# Patient Record
Sex: MALE | Race: BLACK OR AFRICAN AMERICAN | NOT HISPANIC OR LATINO | Employment: OTHER | URBAN - METROPOLITAN AREA
[De-identification: names, ages, dates, MRNs, and addresses within clinical notes are randomized per-mention and may not be internally consistent; named-entity substitution may affect disease eponyms.]

---

## 2024-08-30 ENCOUNTER — APPOINTMENT (OUTPATIENT)
Dept: OTOLARYNGOLOGY | Facility: CLINIC | Age: 89
End: 2024-08-30
Payer: COMMERCIAL

## 2024-08-30 VITALS — BODY MASS INDEX: 33.31 KG/M2 | HEIGHT: 71 IN

## 2024-08-30 DIAGNOSIS — H61.23 BILATERAL IMPACTED CERUMEN: Primary | ICD-10-CM

## 2024-08-30 DIAGNOSIS — H90.3 SENSORINEURAL HEARING LOSS (SNHL) OF BOTH EARS: ICD-10-CM

## 2024-08-30 PROCEDURE — 1160F RVW MEDS BY RX/DR IN RCRD: CPT | Performed by: NURSE PRACTITIONER

## 2024-08-30 PROCEDURE — 99213 OFFICE O/P EST LOW 20 MIN: CPT | Performed by: NURSE PRACTITIONER

## 2024-08-30 PROCEDURE — 1159F MED LIST DOCD IN RCRD: CPT | Performed by: NURSE PRACTITIONER

## 2024-08-30 PROCEDURE — 1036F TOBACCO NON-USER: CPT | Performed by: NURSE PRACTITIONER

## 2024-08-30 RX ORDER — ATORVASTATIN CALCIUM 80 MG/1
1 TABLET, FILM COATED ORAL DAILY
COMMUNITY

## 2024-08-30 RX ORDER — APIXABAN 5 MG/1
1 TABLET, FILM COATED ORAL 2 TIMES DAILY
COMMUNITY
Start: 2021-11-09

## 2024-08-30 RX ORDER — METFORMIN HYDROCHLORIDE 500 MG/1
1 TABLET ORAL
COMMUNITY
Start: 2021-08-16

## 2024-08-30 RX ORDER — PANTOPRAZOLE SODIUM 20 MG/1
20 TABLET, DELAYED RELEASE ORAL DAILY
COMMUNITY

## 2024-08-30 RX ORDER — ASPIRIN 81 MG/1
1 TABLET ORAL DAILY
COMMUNITY

## 2024-08-30 RX ORDER — AMMONIUM LACTATE 12 G/100G
LOTION TOPICAL 2 TIMES DAILY
COMMUNITY
Start: 2022-01-06

## 2024-08-30 RX ORDER — FERROUS SULFATE 325(65) MG
1 TABLET ORAL DAILY
COMMUNITY

## 2024-08-30 RX ORDER — LISINOPRIL 5 MG/1
2.5 TABLET ORAL
COMMUNITY
Start: 2021-05-11

## 2024-08-30 RX ORDER — SODIUM CHLORIDE 0.9 % (FLUSH) 0.9 %
2-10 SYRINGE (ML) INJECTION
COMMUNITY
Start: 2023-12-14 | End: 2024-12-13

## 2024-08-30 RX ORDER — SITAGLIPTIN 100 MG/1
1 TABLET, FILM COATED ORAL DAILY
COMMUNITY

## 2024-08-30 RX ORDER — LATANOPROST 50 UG/ML
1 SOLUTION/ DROPS OPHTHALMIC NIGHTLY
COMMUNITY
Start: 2023-04-19

## 2024-08-30 RX ORDER — PIOGLITAZONEHYDROCHLORIDE 45 MG/1
1 TABLET ORAL DAILY
COMMUNITY
Start: 2021-11-15

## 2024-08-30 RX ORDER — EMPAGLIFLOZIN 25 MG/1
1 TABLET, FILM COATED ORAL
COMMUNITY
Start: 2024-06-30

## 2024-08-30 RX ORDER — CLOPIDOGREL BISULFATE 75 MG/1
1 TABLET ORAL DAILY
COMMUNITY

## 2024-08-30 ASSESSMENT — PATIENT HEALTH QUESTIONNAIRE - PHQ9
SUM OF ALL RESPONSES TO PHQ9 QUESTIONS 1 AND 2: 0
2. FEELING DOWN, DEPRESSED OR HOPELESS: NOT AT ALL
1. LITTLE INTEREST OR PLEASURE IN DOING THINGS: NOT AT ALL

## 2024-08-30 NOTE — PROGRESS NOTES
Subjective   Patient ID: Farooq Quiles is a 89 y.o. male who presents for Follow-up (Ear cleaning.).  HPI  This patient presents for scheduled cerumen removal.  His last cleaning was in August 2022.  Patient has a history of bilateral sensorineural hearing loss and recently got new hearing aids.  His outside audiologist noted left greater than right cerumen impactions.  Today, he denies any otalgia, otorrhea.  Review of Systems  A comprehensive or 10 points review of the patient's constitutional, neurological, HEENT, pulmonary, cardiovascular and genito-urinary systems showed only those mentioned in history of present illness.    Objective   Physical Exam  Constitutional: no fever, chills, weight loss or weight gain   General appearance: Appears well, well-nourished, well groomed. No acute distress.   Communication: Normal communication   Psychiatric: Oriented to person, place and time. Normal mood and affect.   Neurologic: Cranial nerves II-XII grossly intact and symmetric bilaterally.   Head and Face:   Head: Atraumatic with no masses, lesions or scarring.   Face: Normal symmetry, no paralysis, synkinesis or facial tic. No scars or deformities.     Eyes: Conjunctiva not edematous or erythematous   Ears: External inspection of ears with no deformity, scars or masses.  Bilateral canals with cerumen impactions.     Neck: Normal appearing, symmetric, trachea midline.   Cardiovascular: Examination of peripheral vascular system shows no clubbing or cyanosis.   Respiratory: No respiratory distress increased work of breathing. Inspection of the chest with symmetric chest expansion and normal respiratory effort.   Skin: No rashes in the head or neck    Assessment/Plan     This patient presents for subsequent evaluation of acute acquired bilateral cerumen impaction and chronic bilateral sensorineural hearing loss.    Reassurance given that otologic exam is normal after cleaning.  He may follow-up as needed.  All questions  were answered to patient's satisfaction.    This note was created using speech recognition transcription software. Despite proofreading, several typographical errors might be present that might affect the meaning of the content. Please call with any questions.  Patient ID: Farooq Quiles is a 89 y.o. male.    Ear cerumen removal    Date/Time: 8/30/2024 11:32 AM    Performed by: MARY Willard  Authorized by: MARY Willard    Consent:     Consent obtained:  Verbal    Consent given by:  Patient    Risks discussed:  Pain    Alternatives discussed:  No treatment  Procedure details:     Location:  L ear and R ear    Procedure type comment:  Suction and alligator    Procedure outcomes: cerumen removed    Post-procedure details:     Inspection:  No bleeding, ear canal clear and TM intact    Hearing quality:  Improved    Procedure completion:  Tolerated well, no immediate complications  Comments:      Significantly more cerumen removed from left side         MARY Willard 08/30/24 11:31 AM